# Patient Record
Sex: FEMALE | Race: BLACK OR AFRICAN AMERICAN | Employment: OTHER | URBAN - METROPOLITAN AREA
[De-identification: names, ages, dates, MRNs, and addresses within clinical notes are randomized per-mention and may not be internally consistent; named-entity substitution may affect disease eponyms.]

---

## 2017-01-03 ENCOUNTER — HOSPITAL ENCOUNTER (OUTPATIENT)
Dept: MAMMOGRAPHY | Age: 82
Discharge: HOME | End: 2017-01-03
Attending: INTERNAL MEDICINE
Payer: MEDICARE

## 2017-01-03 DIAGNOSIS — Z85.3 PERSONAL HISTORY OF BREAST CANCER: ICD-10-CM

## 2017-01-03 PROCEDURE — 77066 DX MAMMO INCL CAD BI: CPT

## 2017-04-04 ENCOUNTER — HOSPITAL ENCOUNTER (OUTPATIENT)
Dept: MRI IMAGING | Age: 82
Discharge: HOME | End: 2017-04-04
Attending: PHYSICAL MEDICINE & REHABILITATION
Payer: MEDICARE

## 2017-04-04 DIAGNOSIS — M51.36 DDD (DEGENERATIVE DISC DISEASE), LUMBAR: ICD-10-CM

## 2017-04-04 DIAGNOSIS — M54.42 ACUTE BACK PAIN WITH SCIATICA, LEFT: ICD-10-CM

## 2017-04-04 PROCEDURE — 72148 MRI LUMBAR SPINE W/O DYE: CPT

## 2017-06-19 ENCOUNTER — HOSPITAL ENCOUNTER (OUTPATIENT)
Dept: MAMMOGRAPHY | Age: 82
Discharge: HOME | End: 2017-06-19
Attending: INTERNAL MEDICINE
Payer: MEDICARE

## 2017-06-19 DIAGNOSIS — R92.8 ABNORMAL MAMMOGRAM: ICD-10-CM

## 2017-06-19 PROCEDURE — 77065 DX MAMMO INCL CAD UNI: CPT

## 2018-07-05 ENCOUNTER — HOSPITAL ENCOUNTER (OUTPATIENT)
Dept: MAMMOGRAPHY | Age: 83
Discharge: HOME | End: 2018-07-05
Attending: INTERNAL MEDICINE
Payer: MEDICARE

## 2018-07-05 DIAGNOSIS — D05.91 CARCINOMA IN SITU OF RIGHT BREAST: ICD-10-CM

## 2018-07-05 PROCEDURE — 77066 DX MAMMO INCL CAD BI: CPT

## 2018-07-28 ENCOUNTER — APPOINTMENT (OUTPATIENT)
Dept: GENERAL RADIOLOGY | Age: 83
End: 2018-07-28
Attending: EMERGENCY MEDICINE
Payer: MEDICARE

## 2018-07-28 ENCOUNTER — APPOINTMENT (OUTPATIENT)
Dept: ULTRASOUND IMAGING | Age: 83
End: 2018-07-28
Attending: EMERGENCY MEDICINE
Payer: MEDICARE

## 2018-07-28 ENCOUNTER — HOSPITAL ENCOUNTER (EMERGENCY)
Age: 83
Discharge: HOME | End: 2018-07-28
Attending: EMERGENCY MEDICINE
Payer: MEDICARE

## 2018-07-28 VITALS
SYSTOLIC BLOOD PRESSURE: 152 MMHG | DIASTOLIC BLOOD PRESSURE: 93 MMHG | BODY MASS INDEX: 26.96 KG/M2 | RESPIRATION RATE: 12 BRPM | HEIGHT: 65 IN | OXYGEN SATURATION: 100 % | HEART RATE: 69 BPM | WEIGHT: 161.82 LBS | TEMPERATURE: 98 F

## 2018-07-28 DIAGNOSIS — I83.892 VARICOSE VEINS OF LEFT LOWER EXTREMITY WITH EDEMA: ICD-10-CM

## 2018-07-28 DIAGNOSIS — M25.572 ACUTE LEFT ANKLE PAIN: Primary | ICD-10-CM

## 2018-07-28 PROCEDURE — 99283 EMERGENCY DEPT VISIT LOW MDM: CPT

## 2018-07-28 PROCEDURE — 93971 EXTREMITY STUDY: CPT

## 2018-07-28 PROCEDURE — 73610 X-RAY EXAM OF ANKLE: CPT

## 2018-07-28 RX ORDER — ANASTROZOLE 1 MG/1
1 TABLET ORAL DAILY
COMMUNITY

## 2018-07-28 RX ORDER — ACETAMINOPHEN 500 MG
1000 TABLET ORAL
Qty: 14 TAB | Refills: 0 | Status: SHIPPED | OUTPATIENT
Start: 2018-07-28

## 2018-07-28 RX ORDER — DICLOFENAC SODIUM 10 MG/G
2 GEL TOPICAL 4 TIMES DAILY
Qty: 100 G | Refills: 0 | Status: SHIPPED | OUTPATIENT
Start: 2018-07-28

## 2018-07-28 RX ORDER — OXYCODONE HYDROCHLORIDE 5 MG/1
5 TABLET ORAL
Qty: 10 TAB | Refills: 0 | Status: SHIPPED | OUTPATIENT
Start: 2018-07-28

## 2018-07-28 NOTE — ED NOTES
Complaint of left ankle pain radiating up the leg to the hip x \"a couple of days\". Pt denies injury or trauma. She confirms a remote history of blood clots, not on blood thinners. Ankle is a bit \"puffy\" and tender to palpation. Pt does have significant varicose veins.

## 2018-07-28 NOTE — DISCHARGE INSTRUCTIONS
Joint Pain: Care Instructions  Your Care Instructions    Many people have small aches and pains from overuse or injury to muscles and joints. Joint injuries often happen during sports or recreation, work tasks, or projects around the home. An overuse injury can happen when you put too much stress on a joint or when you do an activity that stresses the joint over and over, such as using the computer or rowing a boat. You can take action at home to help your muscles and joints get better. You should feel better in 1 to 2 weeks, but it can take 3 months or more to heal completely. Follow-up care is a key part of your treatment and safety. Be sure to make and go to all appointments, and call your doctor if you are having problems. It's also a good idea to know your test results and keep a list of the medicines you take. How can you care for yourself at home? · Do not put weight on the injured joint for at least a day or two. · For the first day or two after an injury, do not take hot showers or baths, and do not use hot packs. The heat could make swelling worse. · Put ice or a cold pack on the sore joint for 10 to 20 minutes at a time. Try to do this every 1 to 2 hours for the next 3 days (when you are awake) or until the swelling goes down. Put a thin cloth between the ice and your skin. · Wrap the injury in an elastic bandage. Do not wrap it too tightly because this can cause more swelling. · Prop up the sore joint on a pillow when you ice it or anytime you sit or lie down during the next 3 days. Try to keep it above the level of your heart. This will help reduce swelling. · Take an over-the-counter pain medicine, such as acetaminophen (Tylenol), ibuprofen (Advil, Motrin), or naproxen (Aleve). Read and follow all instructions on the label. · After 1 or 2 days of rest, begin moving the joint gently.  While the joint is still healing, you can begin to exercise using activities that do not strain or hurt the painful joint. When should you call for help? Call your doctor now or seek immediate medical care if:    · You have signs of infection, such as:  ¨ Increased pain, swelling, warmth, and redness. ¨ Red streaks leading from the joint. ¨ A fever.    Watch closely for changes in your health, and be sure to contact your doctor if:    · Your movement or symptoms are not getting better after 1 to 2 weeks of home treatment. Where can you learn more? Go to http://mona-agnieszka.info/. Enter P205 in the search box to learn more about \"Joint Pain: Care Instructions. \"  Current as of: November 29, 2017  Content Version: 11.7  © 8518-2231 [employer]. Care instructions adapted under license by [employer] (which disclaims liability or warranty for this information). If you have questions about a medical condition or this instruction, always ask your healthcare professional. [place] [address] any warranty or liability for your use of this information. Musculoskeletal Pain: Care Instructions  Your Care Instructions    Different problems with the bones, muscles, nerves, ligaments, and tendons in the body can cause pain. One or more areas of your body may ache or burn. Or they may feel tired, stiff, or sore. The medical term for this type of pain is musculoskeletal pain. It can have many different causes. Sometimes the pain is caused by an injury such as a strain or sprain. Or you might have pain from using one part of your body in the same way over and over again. This is called overuse. In some cases, the cause of the pain is another health problem such as arthritis or fibromyalgia. The doctor will examine you and ask you questions about your health to help find the cause of your pain. Blood tests or imaging tests like an X-ray may also be helpful. But sometimes doctors can't find a cause of the pain.   Treatment depends on your symptoms and the cause of the pain, if known. The doctor has checked you carefully, but problems can develop later. If you notice any problems or new symptoms, get medical treatment right away. Follow-up care is a key part of your treatment and safety. Be sure to make and go to all appointments, and call your doctor if you are having problems. It's also a good idea to know your test results and keep a list of the medicines you take. How can you care for yourself at home? · Rest until you feel better. · Do not do anything that makes the pain worse. Return to exercise gradually if you feel better and your doctor says it's okay. · Be safe with medicines. Read and follow all instructions on the label. ¨ If the doctor gave you a prescription medicine for pain, take it as prescribed. ¨ If you are not taking a prescription pain medicine, ask your doctor if you can take an over-the-counter medicine. · Put ice or a cold pack on the area for 10 to 20 minutes at a time to ease pain. Put a thin cloth between the ice and your skin. When should you call for help? Call your doctor now or seek immediate medical care if:    · You have new pain, or your pain gets worse.     · You have new symptoms such as a fever, a rash, or chills.    Watch closely for changes in your health, and be sure to contact your doctor if:    · You do not get better as expected. Where can you learn more? Go to http://mona-agnieszka.info/. Enter T609 in the search box to learn more about \"Musculoskeletal Pain: Care Instructions. \"  Current as of: October 9, 2017  Content Version: 11.7  © 4181-3563 [employer]. Care instructions adapted under license by [employer] (which disclaims liability or warranty for this information).  If you have questions about a medical condition or this instruction, always ask your healthcare professional. [address] any warranty or liability for your use of this information.

## 2018-07-28 NOTE — ED PROVIDER NOTES
EMERGENCY DEPARTMENT HISTORY AND PHYSICAL EXAM 
 
 
Date: 7/28/2018 Patient Name: Racheal Adames History of Presenting Illness Chief Complaint Patient presents with  Ankle Pain Swelling to LEFT ankle without injury \"for a long time\" just recently started aching. History Provided By: Patient HPI: Racheal Adames, 80 y.o. female with PMHx significant for hypercholesterolemia, HTN, RA, and breast CA presents via wheelchair to the ED with cc of chronic sore left ankle pain radiating up the LLE exacerbated over the last 2-3 days. Pt reports associated sx of left ankle swelling as well. She expresses her pain is exacerbated with movement of the joint and has found no relieving with Ibuprofen leading her to the ED for evaluation. Pt discloses no recent falls or trauma to cause her sx but notes a h/o DVT. Of note pt is not anticoagulated. She denies any fevers, chills, chest pain, SOB, abdominal pain, nausea, vomiting, diarrhea, leg swelling or dysuria. There are no other complaints, changes, or physical findings at this time. PCP: Evelia Merlin, MD 
 
Current Outpatient Prescriptions Medication Sig Dispense Refill  
 HYDROcodone-acetaminophen (NORCO) 5-325 mg per tablet Take 1 Tab by mouth every six (6) hours as needed for Pain. Max Daily Amount: 4 Tabs. 20 Tab 0  
 docusate sodium (COLACE) 100 mg capsule Take 1 Cap by mouth two (2) times a day. May use over-the counter equivalent instead. Take twice daily while on narcotic pain reliever to prevent constipation. 60 Cap 0  
 HYDROcodone-acetaminophen (NORCO) 5-325 mg per tablet Take 1-2 Tabs by mouth every four (4) hours as needed for Pain. Max Daily Amount: 12 Tabs. 30 Tab 0  
 multivitamin (ONE A DAY) tablet Take 1 Tab by mouth daily.  Cholecalciferol, Vitamin D3, (VITAMIN D3) 1,000 unit cap Take 1,000 Units by mouth daily.     
 naproxen sodium (ALEVE) 220 mg tablet Take 220 mg by mouth two (2) times daily as needed for Pain. Indications: PAIN    
 acetaminophen (TYLENOL EXTRA STRENGTH) 500 mg tablet Take 500 mg by mouth every six (6) hours as needed for Pain. Indications: PAIN    
 aspirin 81 mg chewable tablet Take 81 mg by mouth daily.  pravastatin (PRAVACHOL) 80 mg tablet Take 80 mg by mouth nightly. Indications: HYPERCHOLESTEROLEMIA  metoprolol (LOPRESSOR) 25 mg tablet Take 25 mg by mouth daily. Indications: HYPERTENSION    
 Hydrochlorothiazide (HYDRODIURIL) 12.5 mg tablet Take 12.5 mg by mouth daily. Indications: EDEMA, HYPERTENSION Past History Past Medical History: 
Past Medical History:  
Diagnosis Date  Arthritis  Breast cancer ([address] 2015  
 (+)  Cancer ([address] breast cancer  Hypercholesterolemia  Hypertension Past Surgical History: 
Past Surgical History:  
Procedure Laterality Date  BREAST SURGERY PROCEDURE UNLISTED  Unknown Left Breast Surgery/ biopsy  HX APPENDECTOMY  HX BREAST BIOPSY Right 4/24/2015 RIGHT BREAST LUMPECTOMY W/NEEDLE LOC performed by Donalee Leventhal, MD at [facility] MAIN OR  
 HX BREAST BIOPSY Left yrs ago (-)  HX BREAST LUMPECTOMY Right 04/2015 Family History: 
Family History Problem Relation Age of Onset  Stroke Mother  Cancer Father  Alzheimer Sister  Heart Attack Brother  Breast Cancer Niece Social History: 
Social History Substance Use Topics  Smoking status: Never Smoker  Smokeless tobacco: Never Used  Alcohol use No  
 
 
Allergies: 
No Known Allergies Review of Systems Review of Systems Constitutional: Negative for chills and fever. HENT: Negative for congestion and sore throat. Eyes: Negative for visual disturbance. Respiratory: Negative for cough and shortness of breath. Cardiovascular: Negative for chest pain and leg swelling. Gastrointestinal: Negative for abdominal pain, blood in stool, diarrhea and nausea. Endocrine: Negative for polyuria. Genitourinary: Negative for dysuria, flank pain, vaginal bleeding and vaginal discharge. Musculoskeletal: Positive for arthralgias (left ankle ) and joint swelling (left ankle ). Skin: Negative for rash. Allergic/Immunologic: Negative for immunocompromised state. Neurological: Negative for weakness and headaches. Psychiatric/Behavioral: Negative for confusion. Physical Exam  
Physical Exam  
Constitutional: She is oriented to person, place, and time. She appears well-developed and well-nourished. HENT:  
Head: Normocephalic and atraumatic. Moist mucous membranes Eyes: Conjunctivae are normal. Pupils are equal, round, and reactive to light. Right eye exhibits no discharge. Left eye exhibits no discharge. Neck: Normal range of motion. Neck supple. No tracheal deviation present. Cardiovascular: Normal rate, regular rhythm and normal heart sounds. No murmur heard. DP/PT pulses equal 
Capillary refill normal  
Pulmonary/Chest: Effort normal and breath sounds normal. No respiratory distress. She has no wheezes. She has no rales. Abdominal: Soft. Bowel sounds are normal. There is no tenderness. There is no rebound and no guarding. Musculoskeletal: Normal range of motion. She exhibits no edema. Joint is no erythematous or warm however is TTP Neurological: She is alert and oriented to person, place, and time. Skin: Skin is warm and dry. No rash noted. No erythema. Varicose veins B/L left greater than right Psychiatric: Her behavior is normal.  
Nursing note and vitals reviewed. Diagnostic Study Results Radiologic Studies -  
DUPLEX LOWER EXT VENOUS LEFT Final Result Initial Result:  
  Impression:  
  IMPRESSION: No deep venous thrombosis identified. 
 
 
 
 
  
  Narrative:  
  EXAM:  DUPLEX LOWER EXT VENOUS LEFT 
 
INDICATION:  Leg swelling, pain, DVT suspected COMPARISON: None.  
 
FINDINGS: Duplex Doppler sonography of the left lower extremity was performed 
from the groin to the calf. The left common femoral, femoral and popliteal veins 
are compressible with normal color-flow and wave forms and response to 
physiologic maneuvers including Valsalva and augmentation. XR ANKLE LT MIN 3 V Final Result Initial Result:  
  Impression:  
  IMPRESSION: No acute abnormality. 
 
 
  
  Narrative:  
  EXAM:  XR ANKLE LT MIN 3 V 
 
INDICATION:  pain. COMPARISON: None. FINDINGS: Three views of the left ankle demonstrate no fracture or disruption of 
the ankle mortise.  There is no other acute osseous or articular abnormality. The soft tissues are within normal limits. Medical Decision Making I am the first provider for this patient. I reviewed the vital signs, available nursing notes, past medical history, past surgical history, family history and social history. Vital Signs-Reviewed the patient's vital signs. Patient Vitals for the past 12 hrs: 
 Temp Pulse Resp BP SpO2  
07/28/18 0824 98 °F (36.7 °C) 69 12 (!) 152/93 100 % Pulse Oximetry Analysis - 100% on room air Cardiac Monitor:  
Rate: 69 bpm 
Rhythm: Normal Sinus Rhythm Records Reviewed: Nursing Notes, Old Medical Records and Previous Radiology Studies Provider Notes (Medical Decision Making): DDx: Consistent with arthritis and varicose veins given pain goes up left leg along with hx of blood clots will obtain US, disposition likely to home with pain control ED Course:  
Initial assessment performed. The patients presenting problems have been discussed, and they are in agreement with the care plan formulated and outlined with them. I have encouraged them to ask questions as they arise throughout their visit. Progress Notes: 
 
11:14 AM  
The pt has been re-evaluated. Pt was updated on reassuring XR and US results. She was informed of the plan for discharge with follow up as needed. Critical Care Time: 0 minutes Disposition: 
Discharge Note: 
11:14 AM 
The patient is ready for discharge. The patient's signs, symptoms, diagnosis, and discharge instruction have been discussed and the patient has conveyed their understanding. The patient is to follow up as recommended or return to the ER should their symptoms worsen. Plan has been discussed and the patient is in agreement. Written by Ewelina Rucker ED Scribe, as dictated by Emilio Foster DO 
 
PLAN: 
1. Current Discharge Medication List  
  
START taking these medications Details  
diclofenac (VOLTAREN) 1 % gel Apply 2 g to affected area four (4) times daily. Qty: 100 g, Refills: 0  
  
oxyCODONE IR (ROXICODONE) 5 mg immediate release tablet Take 1 Tab by mouth every four (4) hours as needed for Pain. Max Daily Amount: 30 mg. 
Qty: 10 Tab, Refills: 0 Associated Diagnoses: Acute left ankle pain CONTINUE these medications which have CHANGED Details  
acetaminophen (TYLENOL) 500 mg tablet Take 2 Tabs by mouth every six (6) hours as needed for Pain. Qty: 14 Tab, Refills: 0 STOP taking these medications  
  
 naproxen sodium (ALEVE) 220 mg tablet Comments:  
Reason for Stoppin.  
Follow-up Information Follow up With Details Comments Contact Info [facility] EMERGENCY DEPT Schedule an appointment as soon as possible for a visit  [address] [place] [address] [facility] 
[phone] Return to ED if worse Diagnosis Clinical Impression: 1. Acute left ankle pain Attestations: 
 
Attestation: This note is prepared by Denzel Castro, acting as Scribe for Emilio Foster DO. Emilio Foster DO: The scribe's documentation has been prepared under my direction and personally reviewed by me in its entirety. I confirm that the note above accurately reflects all work, treatment, procedures, and medical decision making performed by me.

## 2018-07-28 NOTE — ED NOTES
Dr Sabine Pak reviewed discharge instructions with the patient. The patient verbalized understanding. All questions and concerns were addressed. The patient declined a wheelchair and is discharged ambulatory in the care of family members with instructions and prescriptions in hand. Pt is alert and oriented x 4. Respirations are clear and unlabored.

## 2019-06-14 ENCOUNTER — HOSPITAL ENCOUNTER (OUTPATIENT)
Dept: MAMMOGRAPHY | Age: 84
Discharge: HOME | End: 2019-06-14
Attending: INTERNAL MEDICINE
Payer: MEDICARE

## 2019-06-14 DIAGNOSIS — D05.91 UNSPECIFIED TYPE OF CARCINOMA IN SITU OF RIGHT BREAST: ICD-10-CM

## 2019-06-14 PROCEDURE — 77066 DX MAMMO INCL CAD BI: CPT

## 2020-06-22 ENCOUNTER — HOSPITAL ENCOUNTER (OUTPATIENT)
Dept: MAMMOGRAPHY | Age: 85
Discharge: HOME | End: 2020-06-22
Attending: INTERNAL MEDICINE
Payer: MEDICARE

## 2020-06-22 DIAGNOSIS — D05.91 CARCINOMA IN SITU OF RIGHT BREAST: ICD-10-CM

## 2020-06-22 PROCEDURE — 77066 DX MAMMO INCL CAD BI: CPT

## 2021-06-07 ENCOUNTER — TRANSCRIBE ORDER (OUTPATIENT)
Dept: SCHEDULING | Age: 86
End: 2021-06-07

## 2021-06-07 DIAGNOSIS — Z12.31 SCREENING MAMMOGRAM FOR HIGH-RISK PATIENT: Primary | ICD-10-CM

## 2021-06-24 ENCOUNTER — HOSPITAL ENCOUNTER (OUTPATIENT)
Dept: MAMMOGRAPHY | Age: 86
Discharge: HOME | End: 2021-06-24
Attending: FAMILY MEDICINE
Payer: MEDICARE

## 2021-06-24 DIAGNOSIS — Z12.31 SCREENING MAMMOGRAM FOR HIGH-RISK PATIENT: ICD-10-CM

## 2021-06-24 PROCEDURE — 77063 BREAST TOMOSYNTHESIS BI: CPT

## 2022-08-18 ENCOUNTER — TRANSCRIBE ORDER (OUTPATIENT)
Dept: SCHEDULING | Age: 87
End: 2022-08-18

## 2022-08-18 DIAGNOSIS — D05.91 UNSPECIFIED TYPE OF CARCINOMA IN SITU OF RIGHT BREAST: Primary | ICD-10-CM

## 2022-10-20 ENCOUNTER — TRANSCRIBE ORDER (OUTPATIENT)
Dept: SCHEDULING | Age: 87
End: 2022-10-20

## 2022-10-20 DIAGNOSIS — Z12.31 SCREENING MAMMOGRAM FOR HIGH-RISK PATIENT: Primary | ICD-10-CM

## 2022-10-28 ENCOUNTER — HOSPITAL ENCOUNTER (OUTPATIENT)
Dept: GENERAL RADIOLOGY | Age: 87
Discharge: HOME | End: 2022-10-28
Payer: MEDICARE

## 2022-10-28 ENCOUNTER — TRANSCRIBE ORDER (OUTPATIENT)
Dept: REGISTRATION | Age: 87
End: 2022-10-28

## 2022-10-28 DIAGNOSIS — R05.9 COUGH: ICD-10-CM

## 2022-10-28 DIAGNOSIS — R05.9 COUGH: Primary | ICD-10-CM

## 2022-10-28 PROCEDURE — 71046 X-RAY EXAM CHEST 2 VIEWS: CPT

## 2022-10-30 ENCOUNTER — APPOINTMENT (OUTPATIENT)
Dept: ULTRASOUND IMAGING | Age: 87
End: 2022-10-30
Attending: EMERGENCY MEDICINE
Payer: MEDICARE

## 2022-10-30 ENCOUNTER — HOSPITAL ENCOUNTER (EMERGENCY)
Age: 87
Discharge: HOME | End: 2022-10-30
Attending: EMERGENCY MEDICINE
Payer: MEDICARE

## 2022-10-30 VITALS
DIASTOLIC BLOOD PRESSURE: 71 MMHG | SYSTOLIC BLOOD PRESSURE: 133 MMHG | HEART RATE: 92 BPM | WEIGHT: 160 LBS | OXYGEN SATURATION: 98 % | TEMPERATURE: 98 F | BODY MASS INDEX: 26.66 KG/M2 | RESPIRATION RATE: 16 BRPM | HEIGHT: 65 IN

## 2022-10-30 DIAGNOSIS — M54.41 ACUTE RIGHT-SIDED LOW BACK PAIN WITH RIGHT-SIDED SCIATICA: Primary | ICD-10-CM

## 2022-10-30 PROCEDURE — 93971 EXTREMITY STUDY: CPT

## 2022-10-30 PROCEDURE — 99284 EMERGENCY DEPT VISIT MOD MDM: CPT

## 2022-10-30 RX ORDER — LIDOCAINE 4 G/100G
PATCH TOPICAL
Qty: 10 EACH | Refills: 0 | Status: SHIPPED | OUTPATIENT
Start: 2022-10-30

## 2022-10-30 RX ORDER — PREDNISONE 10 MG/1
TABLET ORAL
Qty: 21 TABLET | Refills: 0 | Status: SHIPPED | OUTPATIENT
Start: 2022-10-30

## 2022-10-30 NOTE — ED PROVIDER NOTES
EMERGENCY DEPARTMENT HISTORY AND PHYSICAL EXAM      [facility]: [facility]     Pt Name: Noe Chau  MRN: [id number]  [place] 1/27/1932  Date of evaluation: 10/30/2022  Provider: GUI Dejesus   Attending: No att. providers found   PCP: Audrea Denver, MD  Note Started: 11:55 AM     [address]   Chief Complaint   Patient presents with    Leg Pain     Wheelchair into triage with cc of right leg pan from lower leg up to hip. Denies trauma and blood thinner       HISTORY OF PRESENT ILLNESS  (Location, Timing/Onset, Context/Setting, Quality, Duration, Modifying Factors, Severity, Associated Signs and Symptoms) Note limiting factors. Chief Complaint: right leg pain  Noe Chau is a 89 [address] y.o. female who presents by POV with non-traumatic right leg pain. The pain started last night. She also has right sided lower back pain. The leg pain is throughout the leg but concentrated in her knee and radiates to the lower part of her leg. Is a constant, nagging pain that worsens with movement and ambulation. She denies numbness, tingling, saddle paresthesias, incontinence, and back pain. She reports that she had a cough for about a month. She was just sent over for an x-ray a couple days ago which was negative for pneumonia. She denies any shortness of breath, palpitations, or chest pain. There is been no treatment for the leg pain prior to arrival.    Nursing Notes were all reviewed and agreed with or any disagreements were addressed in the HPI. REVIEW OF SYSTEMS   (2-9 systems for level 4, 10 or more for level 5)  Review of Systems   Constitutional:  Negative for chills, diaphoresis and fever. HENT:  Negative for congestion, ear pain, rhinorrhea and sore throat. Respiratory:  Negative for cough and shortness of breath. Cardiovascular:  Negative for chest pain. Gastrointestinal:  Negative for abdominal pain, constipation, diarrhea, nausea and vomiting. Genitourinary:  Negative for difficulty urinating, dysuria, frequency and hematuria. Musculoskeletal:  Positive for arthralgias and myalgias. Negative for back pain.        + right leg pain   Neurological:  Negative for headaches. All other systems reviewed and are negative. Positives and Pertinent negatives as per HPI. Except as noted above in the ROS, all other systems were reviewed and negative. PAST MEDICAL HISTORY  Past Medical History:   Diagnosis Date    Arthritis     Breast cancer ([facility] [address] 2015    (+)    Cancer ([facility] [address]     breast cancer    Hypercholesterolemia     Hypertension        SURGICAL HISTORY  Past Surgical History:   Procedure Laterality Date    HX APPENDECTOMY      HX BREAST BIOPSY Right 4/24/2015    RIGHT BREAST LUMPECTOMY W/NEEDLE LOC performed by Sunshine Denise MD at [facility] MAIN OR    HX BREAST BIOPSY Left yrs ago    (-)    HX BREAST LUMPECTOMY Right 04/2015    [place] BREAST SURGERY PROCEDURE UNLISTED  Unknown    Left Breast Surgery/ biopsy       CURRENTMEDICATIONS   Discharge Medication List as of 10/30/2022  1:45 PM        CONTINUE these medications which have NOT CHANGED    Details   anastrozole (ARIMIDEX) 1 mg tablet Take 1 mg by mouth daily. , Historical Med      acetaminophen (TYLENOL) 500 mg tablet Take 2 Tabs by mouth every six (6) hours as needed for Pain., Print, Disp-14 Tab, R-0      diclofenac (VOLTAREN) 1 % gel Apply 2 g to affected area four (4) times daily. , Print, Disp-100 g, R-0      oxyCODONE IR (ROXICODONE) 5 mg immediate release tablet Take 1 Tab by mouth every four (4) hours as needed for Pain. Max Daily Amount: 30 mg., Print, Disp-10 Tab, R-0      !! HYDROcodone-acetaminophen (NORCO) 5-325 mg per tablet Take 1 Tab by mouth every six (6) hours as needed for Pain. Max Daily Amount: 4 Tabs., Print, Disp-20 Tab, R-0      docusate sodium (COLACE) 100 mg capsule Take 1 Cap by mouth two (2) times a day. May use over-the counter equivalent instead.   Take twice daily while on narcotic pain reliever to prevent constipation. , Print, Disp-60 Cap, R-0      !! HYDROcodone-acetaminophen (NORCO) 5-325 mg per tablet Take 1-2 Tabs by mouth every four (4) hours as needed for Pain. Max Daily Amount: 12 Tabs., Print, Disp-30 Tab, R-0      multivitamin (ONE A DAY) tablet Take 1 Tab by mouth daily. , Historical Med      Cholecalciferol, Vitamin D3, (VITAMIN D3) 1,000 unit cap Take 1,000 Units by mouth daily. , Historical Med      aspirin 81 mg chewable tablet Take 81 mg by mouth daily. , Historical Med      pravastatin (PRAVACHOL) 80 mg tablet Take 80 mg by mouth nightly. Indications: HYPERCHOLESTEROLEMIA, Historical Med      metoprolol (LOPRESSOR) 25 mg tablet Take 25 mg by mouth daily. Indications: HYPERTENSION, Historical Med      Hydrochlorothiazide (HYDRODIURIL) 12.5 mg tablet Take 12.5 mg by mouth daily. Indications: EDEMA, HYPERTENSION, Historical Med       !! - Potential duplicate medications found. Please discuss with provider. ALLERGIES   Patient has no known allergies. FAMILYHISTORY   Family History   Problem Relation Age of Onset    Stroke Mother     Cancer Father     Alzheimer's Disease Sister     Heart Attack Brother     Breast Cancer Niece        SOCIAL HISTORY   Social History     Tobacco Use    Smoking status: Never    Smokeless tobacco: Never   Substance Use Topics    Alcohol use: No    Drug use: No       PHYSICAL EXAM   (up to 7 for level 4, 8 or more for level 5)  Patient Vitals for the past 12 hrs:   Temp Pulse Resp BP SpO2   10/30/22 1253 98 °F (36.7 °C) -- -- -- --   10/30/22 1059 -- 92 16 133/71 98 %      Physical Exam  Vitals and nursing note reviewed. Constitutional:       General: She is not in acute distress. Appearance: She is well-developed. She is not diaphoretic. Comments: [address] y.[address]. -American female    HENT:      Head: Normocephalic and atraumatic. Eyes:      General:         Right eye: No discharge. Left eye: No discharge. Conjunctiva/sclera: Conjunctivae normal.   Cardiovascular:      Rate and Rhythm: Normal rate and regular rhythm. Pulses: Normal pulses. Heart sounds: Normal heart sounds. No murmur heard. Pulmonary:      Effort: Pulmonary effort is normal. No respiratory distress. Breath sounds: Normal breath sounds. Musculoskeletal:      Cervical back: Normal range of motion and neck supple. Comments: RIGHT LEG: No swelling, ecchymosis, deformity, rash, edema, or wounds. There is mild, diffuse, nonspecific tenderness. Full range of motion of the hip, knee, ankle, and toes. Palpable dorsalis pedis and posterior tibial pulses. BACK: Normal spinal curvatures. No step off or deformity. Slight tenderness to palpation of the right lower back. Negative supine SLR bilaterally. Strength of the LE 5/5 and equal bilaterally. Ambulatory. Skin:     General: Skin is warm and dry. Neurological:      Mental Status: She is alert and oriented to person, place, and time. Psychiatric:         Behavior: Behavior normal.        DIAGNOSTIC RESULTS  LABS:  No results found for this or any previous visit (from the past 12 hour(s)). EKG: When ordered, EKG's are interpreted by the Emergency Department Physician in the absence of a cardiologist.  Please see their note for interpretation of EKG. RADIOLOGY: All images such as plain films, CT, Ultrasound and MRI are read by the radiologist. Interpretation per the Radiologist below, if available at the time of this note:  DUPLEX LOWER EXT VENOUS RIGHT    Result Date: 10/30/2022  · No evidence of acute deep vein thrombosis in the right common femoral, femoral, popliteal, posterior tibial, and peroneal veins. The veins were imaged in the transverse and longitudinal planes. The vessels showed normal color filling and compressibility. Doppler interrogation showed phasic and spontaneous flow.         PROCEDURES - Unless otherwise noted below, none  Procedures    CRITICAL CARE TIME: none    CONSULTS:  None    DIFFERENTIAL DIAGNOSIS/MDM:  I reviewed the vital signs, available nursing notes, past medical history, past surgical history, family history and social history. Records Reviewed: Nursing Notes and Old Medical Records  Reviewed CXR result from 2 days ago. Provider Notes (Medical Decision Making): The patient presents ED with stable vital signs for nontraumatic pain in her back and right leg. Differential diagnosis includes but is not limited to sprain, strain, sciatica, DDD, DJD, herniated disc, DVT, Baker's cyst.  Ultrasound negative for DVT. Plain film imaging not warranted at this time due to lack of trauma. Will place patient on topical analgesics and steroids. It was recommended that she follow-up with primary care medicine or possibly orthopedics if not getting better. She can return to the ED for deterioration. EMERGENCY DEPARTMENT COURSE:   I am the first provider for this patient. Initial assessment performed. The patients presenting problems have been discussed, and they are in agreement with the care plan formulated and outlined with them. I have encouraged them to ask questions as they arise throughout their visit. Vitals:   Patient Vitals for the past 12 hrs:   Temp Pulse Resp BP SpO2   10/30/22 1253 98 °F (36.7 °C) -- -- -- --   10/30/22 1059 -- 92 16 133/71 98 %        Patient was given the following medications:  Medications - No data to display    DISCHARGE NOTE:  1:01 PM  The pt is ready for discharge. The pt's signs, symptoms, diagnosis, and discharge instructions have been discussed and pt has conveyed their understanding. The pt is to follow up as recommended or return to ER should their symptoms worsen. Plan has been discussed and pt is in agreement. FINAL IMPRESSION  1.  Acute right-sided low back pain with right-sided sciatica         DISPOSITION/PLAN  Discharged    PATIENT REFERRED TO:  Follow-up Information       Follow up With Specialties Details Why Contact Info    Breana Goldman MD Family Medicine In 1 week As needed, If not improved [address]  [phone]      [facility] EMERGENCY DEPT Emergency Medicine  If symptoms worsen [address] [place] [address]  [phone]          DISCHARGE MEDICATIONS:  Discharge Medication List as of 10/30/2022  1:45 PM        START taking these medications    Details   predniSONE (STERAPRED DS) 10 mg dose pack Standard 6 day taper, Normal, Disp-21 Tablet, R-0      lidocaine (Lido Al) 4 % patch Apply for 12 hours. Remove for 12 hours prior to applying another patch., Normal, Disp-10 Each, R-0           CONTINUE these medications which have NOT CHANGED    Details   anastrozole (ARIMIDEX) 1 mg tablet Take 1 mg by mouth daily. , Historical Med      acetaminophen (TYLENOL) 500 mg tablet Take 2 Tabs by mouth every six (6) hours as needed for Pain., Print, Disp-14 Tab, R-0      diclofenac (VOLTAREN) 1 % gel Apply 2 g to affected area four (4) times daily. , Print, Disp-100 g, R-0      oxyCODONE IR (ROXICODONE) 5 mg immediate release tablet Take 1 Tab by mouth every four (4) hours as needed for Pain. Max Daily Amount: 30 mg., Print, Disp-10 Tab, R-0      !! HYDROcodone-acetaminophen (NORCO) 5-325 mg per tablet Take 1 Tab by mouth every six (6) hours as needed for Pain. Max Daily Amount: 4 Tabs., Print, Disp-20 Tab, R-0      docusate sodium (COLACE) 100 mg capsule Take 1 Cap by mouth two (2) times a day. May use over-the counter equivalent instead. Take twice daily while on narcotic pain reliever to prevent constipation. , Print, Disp-60 Cap, R-0      !! HYDROcodone-acetaminophen (NORCO) 5-325 mg per tablet Take 1-2 Tabs by mouth every four (4) hours as needed for Pain. Max Daily Amount: 12 Tabs., Print, Disp-30 Tab, R-0      multivitamin (ONE A DAY) tablet Take 1 Tab by mouth daily. , Historical Med      Cholecalciferol, Vitamin D3, (VITAMIN D3) 1,000 unit cap Take 1,000 Units by mouth daily. , Historical Med      aspirin 81 mg chewable tablet Take 81 mg by mouth daily. , Historical Med      pravastatin (PRAVACHOL) 80 mg tablet Take 80 mg by mouth nightly. Indications: HYPERCHOLESTEROLEMIA, Historical Med      metoprolol (LOPRESSOR) 25 mg tablet Take 25 mg by mouth daily. Indications: HYPERTENSION, Historical Med      Hydrochlorothiazide (HYDRODIURIL) 12.5 mg tablet Take 12.5 mg by mouth daily. Indications: EDEMA, HYPERTENSION, Historical Med       !! - Potential duplicate medications found. Please discuss with provider. I have seen and evaluated the patient. My supervision physician was available for consultation. GUI Gallagher     Please note that this dictation was completed with [employer], the computer voice recognition software. Quite often unanticipated grammatical, syntax, homophones, and other interpretive errors are inadvertently transcribed by the computer software. Please disregards these errors. Please excuse any errors that have escaped final proofreading.

## 2022-10-30 NOTE — DISCHARGE INSTRUCTIONS
It was a pleasure taking care of you at [facility] Emergency Department today. We know that when you come to [facility], you are entrusting us with your health, comfort, and safety. Our physicians and nurses honor that trust, and we truly appreciate the opportunity to care for you and your loved ones. We also value your feedback. If you receive a survey about your Emergency Department experience today, please fill it out. We care about our patients' feedback, and we listen to what you have to say. Thank you!

## 2022-11-25 ENCOUNTER — HOSPITAL ENCOUNTER (OUTPATIENT)
Dept: MAMMOGRAPHY | Age: 87
Discharge: HOME | End: 2022-11-25
Attending: INTERNAL MEDICINE
Payer: MEDICARE

## 2022-11-25 DIAGNOSIS — Z12.31 SCREENING MAMMOGRAM FOR HIGH-RISK PATIENT: ICD-10-CM

## 2022-11-25 PROCEDURE — 77063 BREAST TOMOSYNTHESIS BI: CPT

## 2023-04-30 RX ORDER — HYDROCODONE BITARTRATE AND ACETAMINOPHEN 5; 325 MG/1; MG/1
1 TABLET ORAL EVERY 6 HOURS PRN
COMMUNITY
Start: 2016-10-27

## 2023-12-01 ENCOUNTER — HOSPITAL ENCOUNTER (OUTPATIENT)
Facility: HOSPITAL | Age: 88
End: 2023-12-01
Attending: FAMILY MEDICINE
Payer: MEDICARE

## 2023-12-01 VITALS — HEIGHT: 65 IN | BODY MASS INDEX: 26.66 KG/M2 | WEIGHT: 160 LBS

## 2023-12-01 DIAGNOSIS — Z12.31 VISIT FOR SCREENING MAMMOGRAM: ICD-10-CM

## 2023-12-01 PROCEDURE — 77067 SCR MAMMO BI INCL CAD: CPT

## 2024-12-18 ENCOUNTER — HOSPITAL ENCOUNTER (OUTPATIENT)
Facility: HOSPITAL | Age: 88
Discharge: HOME | End: 2024-12-21
Attending: FAMILY MEDICINE
Payer: MEDICARE

## 2024-12-18 VITALS — BODY MASS INDEX: 26.66 KG/M2 | WEIGHT: 160 LBS | HEIGHT: 65 IN

## 2024-12-18 DIAGNOSIS — Z12.31 VISIT FOR SCREENING MAMMOGRAM: ICD-10-CM

## 2024-12-18 PROCEDURE — 77063 BREAST TOMOSYNTHESIS BI: CPT
